# Patient Record
Sex: MALE | Race: WHITE | NOT HISPANIC OR LATINO
[De-identification: names, ages, dates, MRNs, and addresses within clinical notes are randomized per-mention and may not be internally consistent; named-entity substitution may affect disease eponyms.]

---

## 2022-11-24 PROBLEM — Z00.00 ENCOUNTER FOR PREVENTIVE HEALTH EXAMINATION: Status: ACTIVE | Noted: 2022-11-24

## 2022-12-01 ENCOUNTER — APPOINTMENT (OUTPATIENT)
Dept: ORTHOPEDIC SURGERY | Facility: CLINIC | Age: 67
End: 2022-12-01

## 2022-12-01 VITALS
DIASTOLIC BLOOD PRESSURE: 74 MMHG | SYSTOLIC BLOOD PRESSURE: 132 MMHG | BODY MASS INDEX: 29.82 KG/M2 | HEIGHT: 73 IN | WEIGHT: 225 LBS | HEART RATE: 78 BPM

## 2022-12-01 DIAGNOSIS — Z78.9 OTHER SPECIFIED HEALTH STATUS: ICD-10-CM

## 2022-12-01 DIAGNOSIS — I10 ESSENTIAL (PRIMARY) HYPERTENSION: ICD-10-CM

## 2022-12-01 DIAGNOSIS — Z82.3 FAMILY HISTORY OF STROKE: ICD-10-CM

## 2022-12-01 DIAGNOSIS — Z82.49 FAMILY HISTORY OF ISCHEMIC HEART DISEASE AND OTHER DISEASES OF THE CIRCULATORY SYSTEM: ICD-10-CM

## 2022-12-01 DIAGNOSIS — I48.91 UNSPECIFIED ATRIAL FIBRILLATION: ICD-10-CM

## 2022-12-01 DIAGNOSIS — M79.642 PAIN IN LEFT HAND: ICD-10-CM

## 2022-12-01 DIAGNOSIS — M25.532 PAIN IN LEFT WRIST: ICD-10-CM

## 2022-12-01 DIAGNOSIS — Z83.3 FAMILY HISTORY OF DIABETES MELLITUS: ICD-10-CM

## 2022-12-01 PROCEDURE — 73110 X-RAY EXAM OF WRIST: CPT | Mod: LT

## 2022-12-01 PROCEDURE — 99203 OFFICE O/P NEW LOW 30 MIN: CPT | Mod: 25

## 2022-12-01 PROCEDURE — 76881 US COMPL JOINT R-T W/IMG: CPT | Mod: LT

## 2022-12-01 PROCEDURE — 20612 ASPIRATE/INJ GANGLION CYST: CPT | Mod: LT

## 2022-12-01 PROCEDURE — 73130 X-RAY EXAM OF HAND: CPT | Mod: LT

## 2022-12-01 RX ORDER — RIVAROXABAN 20 MG/1
20 TABLET, FILM COATED ORAL
Qty: 90 | Refills: 0 | Status: ACTIVE | COMMUNITY
Start: 2022-02-07

## 2022-12-01 RX ORDER — AMIODARONE HYDROCHLORIDE 200 MG/1
200 TABLET ORAL
Qty: 90 | Refills: 0 | Status: ACTIVE | COMMUNITY
Start: 2022-07-06

## 2022-12-01 RX ORDER — ATORVASTATIN CALCIUM 10 MG/1
10 TABLET, FILM COATED ORAL
Qty: 90 | Refills: 0 | Status: ACTIVE | COMMUNITY
Start: 2022-07-06

## 2022-12-01 RX ORDER — PANTOPRAZOLE 40 MG/1
40 TABLET, DELAYED RELEASE ORAL
Qty: 90 | Refills: 0 | Status: ACTIVE | COMMUNITY
Start: 2022-07-06

## 2022-12-01 RX ORDER — AZILSARTAN KAMEDOXOMIL AND CHLORTHALIDONE 40; 25 MG/1; MG/1
40-25 TABLET ORAL
Qty: 90 | Refills: 0 | Status: ACTIVE | COMMUNITY
Start: 2022-07-29

## 2022-12-01 RX ORDER — CETIRIZINE HYDROCHLORIDE 10 MG/1
TABLET, FILM COATED ORAL
Refills: 0 | Status: ACTIVE | COMMUNITY

## 2022-12-01 NOTE — HISTORY OF PRESENT ILLNESS
[Right] : right hand dominant [FreeTextEntry1] : He comes in today for evaluation of left wrist and hand pain and swelling which began 1 month ago after he was lifting an item.  At times he has pain of 10 out of 10.

## 2022-12-01 NOTE — PHYSICAL EXAM
[de-identified] : - Constitutional: This is a male in no obvious distress.  \par - Psych: Patient is alert and oriented to person, place and time.  Patient has a normal mood and affect.\par - Cardiovascular: Normal pulses throughout the upper extremities.  No significant varicosities are noted in the upper extremities. \par - Neuro: Strength and sensation are intact throughout the upper extremities.  Patient has normal coordination.\par - Respiratory:  Patient exhibits no evidence of shortness of breath or difficulty breathing.\par - Skin: No rashes, lesions, or other abnormalities are noted in the upper extremities.\par \par ---\par \par Examination of his left wrist and hand demonstrates swelling radially and longitudinally, radial to the FCR tendon.  He is tender in this region.  There is mild tenderness along the radiocarpal joint.  There is no swelling or tenderness along the first dorsal compartment.  He has negative Finkelstein sign.  There is no swelling or tenderness along the CMC joint of the thumb.  He is neurovascularly intact distally. [de-identified] : PA, lateral and oblique radiographs of his left wrist and hand demonstrate no obvious fractures or dislocations.\par \par A diagnostic ultrasound of the region of the swelling was indeterminate whether or not this represents a ganglion cyst.

## 2022-12-01 NOTE — DISCUSSION/SUMMARY
[FreeTextEntry1] : He has findings consistent with left wrist and hand injury 1 month ago.  He has swelling and pain.  The swelling may represent a ganglion cyst but this is indeterminate and no fluid was aspirated.\par \par I had a discussion regarding today's visit, the prognosis of this diagnosis and treatment recommendations and options.  At this time, given his history of an injury and persistent symptoms, I have ordered an MRI of his left wrist.  He will follow-up after the MRI to review results and discuss treatment recommendations.\par \par The patient has agreed to this plan of management and has expressed full understanding.  All questions were fully answered to the patient's satisfaction.\par \par My cumulative time spent on this patient's visit included: Preparation for the visit, review of the medical records, review of pertinent diagnostic studies, examination and counseling of the patient on the above diagnosis, treatment plan and prognosis, orders of diagnostic tests, medications and/or appropriate procedures and documentation in the medical records of today's visit.

## 2022-12-01 NOTE — PROCEDURE
[FreeTextEntry1] : Using sterile technique, attempted aspiration of the region of the swelling volarly did not result in any fluid aspirated.

## 2023-03-06 ENCOUNTER — APPOINTMENT (OUTPATIENT)
Dept: MRI IMAGING | Facility: CLINIC | Age: 68
End: 2023-03-06
Payer: MEDICAID

## 2023-03-06 PROCEDURE — 73221 MRI JOINT UPR EXTREM W/O DYE: CPT | Mod: LT

## 2023-03-16 ENCOUNTER — APPOINTMENT (OUTPATIENT)
Dept: ORTHOPEDIC SURGERY | Facility: CLINIC | Age: 68
End: 2023-03-16
Payer: MEDICAID

## 2023-03-16 ENCOUNTER — TRANSCRIPTION ENCOUNTER (OUTPATIENT)
Age: 68
End: 2023-03-16

## 2023-03-16 PROBLEM — S63.502A LEFT WRIST SPRAIN: Status: ACTIVE | Noted: 2022-12-01

## 2023-03-16 PROBLEM — M19.132 SLAC (SCAPHOLUNATE ADVANCED COLLAPSE) OF WRIST, LEFT: Status: ACTIVE | Noted: 2023-03-15

## 2023-03-16 PROCEDURE — 99213 OFFICE O/P EST LOW 20 MIN: CPT

## 2023-03-16 NOTE — PHYSICAL EXAM
[de-identified] : I reviewed an MRI of his left wrist dated 3/6/2023.  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 Dorsal rotation of the lunate.\par Carpal joint effusion/synovitis with edema within and surrounding the dorsal extrinsic ligaments.\par No tendon injury or ganglion.\par \par Previous PA, lateral and oblique radiographs of his left wrist and hand demonstrate no obvious fractures or dislocations.\par \par A previous diagnostic ultrasound of the region of the swelling was indeterminate whether or not this represents a ganglion cyst. [de-identified] : - Constitutional: This is a male in no obvious distress.  \par - Psych: Patient is alert and oriented to person, place and time.  Patient has a normal mood and affect.\par - Cardiovascular: Normal pulses throughout the upper extremities.  No significant varicosities are noted in the upper extremities. \par - Neuro: Strength and sensation are intact throughout the upper extremities.  Patient has normal coordination.\par - Respiratory:  Patient exhibits no evidence of shortness of breath or difficulty breathing.\par - Skin: No rashes, lesions, or other abnormalities are noted in the upper extremities.\par \par ---\par \par Examination of his left wrist and hand demonstrates swelling radially and longitudinally, radial to the FCR tendon.  He is tender in this region.  There is also tenderness along the radiocarpal joint.  There is no swelling or tenderness along the first dorsal compartment.  He has negative Finkelstein sign.  There is no swelling or tenderness along the CMC joint of the thumb.  He is neurovascularly intact distally.

## 2023-03-16 NOTE — PHYSICAL EXAM
[de-identified] : - Constitutional: This is a male in no obvious distress.  \par - Psych: Patient is alert and oriented to person, place and time.  Patient has a normal mood and affect.\par - Cardiovascular: Normal pulses throughout the upper extremities.  No significant varicosities are noted in the upper extremities. \par - Neuro: Strength and sensation are intact throughout the upper extremities.  Patient has normal coordination.\par - Respiratory:  Patient exhibits no evidence of shortness of breath or difficulty breathing.\par - Skin: No rashes, lesions, or other abnormalities are noted in the upper extremities.\par \par ---\par \par Examination of his left wrist and hand demonstrates swelling radially and longitudinally, radial to the FCR tendon.  He is tender in this region.  There is also tenderness along the radiocarpal joint.  There is no swelling or tenderness along the first dorsal compartment.  He has negative Finkelstein sign.  There is no swelling or tenderness along the CMC joint of the thumb.  He is neurovascularly intact distally. 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 Dorsal rotation of the lunate.\par Carpal joint effusion/synovitis with edema within and surrounding the dorsal extrinsic ligaments.\par No tendon injury or ganglion.\par \par Previous PA, lateral and oblique radiographs of his left wrist and hand demonstrate no obvious fractures or dislocations.\par \par A previous diagnostic ultrasound of the region of the swelling was indeterminate whether or not this represents a ganglion cyst.

## 2023-03-16 NOTE — DISCUSSION/SUMMARY
[FreeTextEntry1] : I reviewed the MRI results with him.  I had a discussion regarding today's visit, the diagnosis and treatment recommendations and options.  We also discussed changes since the last visit.  \par \par At this time,\par \par The patient has agreed to the above plan of management and has expressed full understanding.  All questions were fully answered to the patient's satisfaction.\par \par My cumulative time spent on today's visit was greater than 30 minutes and included: Preparation for the visit, review of the medical records, review of pertinent diagnostic studies, examination and counseling of the patient on the above diagnosis, treatment plan and prognosis, orders of diagnostic tests, medications and/or appropriate procedures and documentation in the medical records of today's visit.

## 2023-03-16 NOTE — HISTORY OF PRESENT ILLNESS
[FreeTextEntry1] : Follow-up regarding left wrist pain.  See note from when he was seen in the office 1 week ago.  He is here for cortisone injection at the radiocarpal joint.

## 2023-03-16 NOTE — HISTORY OF PRESENT ILLNESS
[FreeTextEntry1] : Follow-up regarding left wrist swelling.  See note from when he was seen in the office 3 1/2 months ago.  Aspiration was unsuccessful and I ordered an MRI.  He comes in to review results and discuss treatment recommendations.\par \par He is still having pain.\par \par He was accompanied by his girlfriend today, who I recently performed right thumb basal joint arthroplasty and endoscopic carpal tunnel release surgery.

## 2023-03-16 NOTE — DISCUSSION/SUMMARY
[FreeTextEntry1] : I reviewed the MRI results with him.  I had a discussion regarding today's visit, the diagnosis and treatment recommendations and options.  We also discussed changes since the last visit.  At this time, he agreed to proceed with a cortisone injection at the radiocarpal joint.  He opted to delay this, as he will be busy for the next day or 2 and does not want to risk having pain after the injection.  He will make a follow-up appointment for the injection in the near future.\par \par The patient has agreed to the above plan of management and has expressed full understanding.  All questions were fully answered to the patient's satisfaction.\par \par My cumulative time spent on today's visit was greater than 30 minutes and included: Preparation for the visit, review of the medical records, review of pertinent diagnostic studies, examination and counseling of the patient on the above diagnosis, treatment plan and prognosis, orders of diagnostic tests, medications and/or appropriate procedures and documentation in the medical records of today's visit.

## 2023-03-23 ENCOUNTER — APPOINTMENT (OUTPATIENT)
Dept: ORTHOPEDIC SURGERY | Facility: CLINIC | Age: 68
End: 2023-03-23

## 2023-03-23 DIAGNOSIS — S63.502A UNSPECIFIED SPRAIN OF LEFT WRIST, INITIAL ENCOUNTER: ICD-10-CM

## 2023-03-23 DIAGNOSIS — M19.132 POST-TRAUMATIC OSTEOARTHRITIS, LEFT WRIST: ICD-10-CM
